# Patient Record
Sex: FEMALE | Race: WHITE | Employment: FULL TIME | URBAN - METROPOLITAN AREA
[De-identification: names, ages, dates, MRNs, and addresses within clinical notes are randomized per-mention and may not be internally consistent; named-entity substitution may affect disease eponyms.]

---

## 2022-03-18 PROBLEM — F33.9 RECURRENT DEPRESSION: Status: ACTIVE | Noted: 2018-01-03

## 2025-05-27 NOTE — THERAPY EVALUATION
discussed and agreed upon with patient.)  Short-Term Functional Goals: Time Frame: 6 weeks  Patient will demonstrate understanding of and ability to teach back appropriate water and fiber intake, toileting frequency, and positioning for improved self-management of symptoms.   Patient will demonstrate understanding of and ability to teach back two strategies to reduce constipation and improve toileting to minimize strain on and/or paradoxical movement of the pelvic floor muscles.  Patient will demonstrate ability to perform diaphragmatic breathing in multiple positions in order to improve pelvic floor muscle (PFM) lengthening and reduced discomfort and/or straining to aid in bowel evacuation.  Patient will demonstrate understanding of and ability to teach back appropriate water intake, bladder irritants, toileting frequency, and positioning for improved self-management of symptoms.     Discharge Goals: Time Frame: 12 weeks  Patient will demonstrate ability to increase intra-abdominal pressure and eccentrically contract the pelvic floor muscles without breath holding, as assessed by digitally or with use of sEMG biofeedback, in order to improve bowel evacuation.  Patient will report minimal to no pain upon examination of the levator ani muscles and report minimal or no discomfort with bowel evacuation.  Patient will be independent with implementation of a timed voiding schedule and use of urge suppression to reduce urinary frequency to 6-8x/day and 0-1x/night.            Medical Necessity:   > Skilled intervention continues to be required due to above mentioned deficits.  Reason For Services/Other Comments:  > Patient continues to require skilled intervention due to above mentioned deficits.    Regarding Emely Hammer's therapy, I certify that the treatment plan above will be carried out by a therapist or under their direction.  Thank you for this referral,  Gay Campbell, PT       Referring Physician Signature:

## 2025-05-27 NOTE — PROGRESS NOTES
Emely Hammer  : 1970  Primary: [facility] ([facility]en[facility] BAR)  Secondary:  [facility] @ [address]  [place] [address]  Phone: [phone]  Fax: [phone] Plan Frequency: 1x/week for 90 days  Plan of Care/Certification Expiration Date: 25        Plan of Care/Certification Expiration Date:  Plan of Care/Certification Expiration Date: 25    Frequency/Duration: Plan Frequency: 1x/week for 90 days      Time In/Out:   Time In: 1400  Time Out: 1500      PT Visit Info:    Progress Note Due Date: 25      Visit Count:  1    OUTPATIENT PHYSICAL THERAPY:   Treatment Note 2025       Episode  (PFPT)               Treatment Diagnosis:    Lack of coordination  Constipation, unspecified constipation type  Frequency of micturition  Feeling of incomplete bladder emptying  Pelvic and perineal pain  Medical/Referring Diagnosis:    Outlet dysfunction constipation  PFD (pelvic floor dysfunction)      Referring Physician:  Sylvia Brennan PA-C MD Orders:  PT Eval and Treat   Return MD Appt:     Date of Onset:  No data recorded   Allergies:   Patient has no allergy information on record.  Restrictions/Precautions:   None      Interventions Planned (Treatment may consist of any combination of the following):     See Assessment Note    Subjective Comments:     Initial Pain Level::     0/10  Post Session Pain Level:       0/10  Medications Last Reviewed:  2025  Updated Objective Findings:  See Evaluation Note from today  Treatment       THERAPEUTIC ACTIVITY: ( 25 minutes): Functional activity education regarding anatomy, pathology and role of pelvic floor muscle (PFM) function in relation to presenting symptoms and role of pelvic floor therapy in conservative treatment., Functional activity training on bowel massage/colonic massage. Pt provided a handout with instructions to increase performance and understanding in order to facilitate gut

## 2025-05-30 ENCOUNTER — HOSPITAL ENCOUNTER (OUTPATIENT)
Dept: PHYSICAL THERAPY | Age: 55
Setting detail: RECURRING SERIES
End: 2025-05-30
Payer: COMMERCIAL

## 2025-05-30 DIAGNOSIS — R27.9 LACK OF COORDINATION: Primary | ICD-10-CM

## 2025-05-30 DIAGNOSIS — R10.2 PELVIC AND PERINEAL PAIN: ICD-10-CM

## 2025-05-30 DIAGNOSIS — R39.14 FEELING OF INCOMPLETE BLADDER EMPTYING: ICD-10-CM

## 2025-05-30 DIAGNOSIS — K59.00 CONSTIPATION, UNSPECIFIED CONSTIPATION TYPE: ICD-10-CM

## 2025-05-30 DIAGNOSIS — R35.0 FREQUENCY OF MICTURITION: ICD-10-CM

## 2025-05-30 PROCEDURE — 97530 THERAPEUTIC ACTIVITIES: CPT

## 2025-05-30 PROCEDURE — 97161 PT EVAL LOW COMPLEX 20 MIN: CPT

## 2025-05-30 ASSESSMENT — PAIN SCALES - GENERAL: PAINLEVEL_OUTOF10: 0

## 2025-06-06 ENCOUNTER — HOSPITAL ENCOUNTER (OUTPATIENT)
Dept: PHYSICAL THERAPY | Age: 55
Setting detail: RECURRING SERIES
Discharge: HOME | End: 2025-06-09
Payer: COMMERCIAL

## 2025-06-06 PROCEDURE — 97110 THERAPEUTIC EXERCISES: CPT

## 2025-06-06 PROCEDURE — 97140 MANUAL THERAPY 1/> REGIONS: CPT

## 2025-06-06 PROCEDURE — 97530 THERAPEUTIC ACTIVITIES: CPT

## 2025-06-06 ASSESSMENT — PAIN SCALES - GENERAL: PAINLEVEL_OUTOF10: 0

## 2025-06-06 NOTE — PROGRESS NOTES
Emely Hammer  : 1970  Primary: [facility] (Keenan NGUYENBS)  Secondary:  [facility] @ [address]  [place] [address]  Phone: [phone]  Fax: [phone] Plan Frequency: 1x/week for 90 days  Plan of Care/Certification Expiration Date: 25        Plan of Care/Certification Expiration Date:  Plan of Care/Certification Expiration Date: 25    Frequency/Duration: Plan Frequency: 1x/week for 90 days      Time In/Out:   Time In: 1300  Time Out: 1353      PT Visit Info:    Progress Note Due Date: 25      Visit Count:  2    OUTPATIENT PHYSICAL THERAPY:   Treatment Note 2025       Episode  (PFPT)               Treatment Diagnosis:    No data found  Lack of coordination  Constipation, unspecified constipation type  Frequency of micturition  Feeling of incomplete bladder emptying  Pelvic and perineal pain  Contributing Diagnosis:  Bladder pain (R39.89), Constipation, unspecified (K59.00), Frequency of micturition (R35.0), Hesitancy of micturition (R39.11), Interstitial cystitis (chronic) (N30.1), Nocturia (R35.1), Pelvic floor dysfunction in female (M62.98), and Urgency of urination (R39.15)  Medical/Referring Diagnosis:    Outlet dysfunction constipation  PFD (pelvic floor dysfunction)      Referring Physician:  Sylvia Brennan PA-C MD Orders:  PT Eval and Treat   Return MD Appt:     Date of Onset:  No data recorded   Allergies:   Patient has no allergy information on record.  Restrictions/Precautions:   None      Interventions Planned (Treatment may consist of any combination of the following):     See Assessment Note    Subjective Comments:   Symptoms have gotten worse since she began menopause.      She was just diagnosed with IC last week. She did have anorectal manometry done and results showed paradoxical movement of the anal spincter with pushing.   She tends to have bowel movements every 4-7 days. She is taking mag citrate daily.

## 2025-06-13 ENCOUNTER — HOSPITAL ENCOUNTER (OUTPATIENT)
Dept: PHYSICAL THERAPY | Age: 55
Setting detail: RECURRING SERIES
Discharge: HOME | End: 2025-06-16
Payer: COMMERCIAL

## 2025-06-13 PROCEDURE — 97140 MANUAL THERAPY 1/> REGIONS: CPT

## 2025-06-13 PROCEDURE — 97110 THERAPEUTIC EXERCISES: CPT

## 2025-06-13 PROCEDURE — 97530 THERAPEUTIC ACTIVITIES: CPT

## 2025-06-13 ASSESSMENT — PAIN SCALES - GENERAL: PAINLEVEL_OUTOF10: 0

## 2025-06-13 NOTE — PROGRESS NOTES
Emely Hammer  : 1970  Primary: [facility] (Keen[facility] NGUYENBS)  Secondary:  [facility] @ [address]  [place] [address]  Phone: [phone]  Fax: [phone] Plan Frequency: 1x/week for 90 days  Plan of Care/Certification Expiration Date: 25        Plan of Care/Certification Expiration Date:  Plan of Care/Certification Expiration Date: 25    Frequency/Duration: Plan Frequency: 1x/week for 90 days      Time In/Out:   Time In: 1300  Time Out: 1355      PT Visit Info:    Progress Note Due Date: 25      Visit Count:  3    OUTPATIENT PHYSICAL THERAPY:   Treatment Note 2025       Episode  (PFPT)               Treatment Diagnosis:    No data found  Lack of coordination  Constipation, unspecified constipation type  Frequency of micturition  Feeling of incomplete bladder emptying  Pelvic and perineal pain  Contributing Diagnosis:  Bladder pain (R39.89), Constipation, unspecified (K59.00), Frequency of micturition (R35.0), Hesitancy of micturition (R39.11), Interstitial cystitis (chronic) (N30.1), Nocturia (R35.1), Pelvic floor dysfunction in female (M62.98), and Urgency of urination (R39.15)  Medical/Referring Diagnosis:    Outlet dysfunction constipation  PFD (pelvic floor dysfunction)      Referring Physician:  Sylvia Brennan PA-C MD Orders:  PT Eval and Treat   Return MD Appt:     Date of Onset:  No data recorded   Allergies:   Patient has no allergy information on record.  Restrictions/Precautions:   None      Interventions Planned (Treatment may consist of any combination of the following):     See Assessment Note    Subjective Comments:   Symptoms have gotten worse since she began menopause.      She was just diagnosed with IC last week. She did have anorectal manometry done and results showed paradoxical movement of the anal spincter with pushing.   She tends to have bowel movements every 4-7 days. She is taking mag citrate

## 2025-06-20 ENCOUNTER — APPOINTMENT (OUTPATIENT)
Dept: PHYSICAL THERAPY | Age: 55
End: 2025-06-20
Payer: COMMERCIAL

## 2025-06-27 ENCOUNTER — HOSPITAL ENCOUNTER (OUTPATIENT)
Dept: PHYSICAL THERAPY | Age: 55
Setting detail: RECURRING SERIES
Discharge: HOME | End: 2025-06-30
Payer: COMMERCIAL

## 2025-06-27 PROCEDURE — 97110 THERAPEUTIC EXERCISES: CPT

## 2025-06-27 PROCEDURE — 97530 THERAPEUTIC ACTIVITIES: CPT

## 2025-06-27 ASSESSMENT — PAIN SCALES - GENERAL: PAINLEVEL_OUTOF10: 0

## 2025-06-27 NOTE — PROGRESS NOTES
Emely Hammer  : 1970  Primary: [facility] (Keenan NGUYENBS)  Secondary:  [facility] @ [address]  [place] [address]  Phone: [phone]  Fax: [phone] Plan Frequency: 1x/week for 90 days  Plan of Care/Certification Expiration Date: 25        Plan of Care/Certification Expiration Date:  Plan of Care/Certification Expiration Date: 25    Frequency/Duration: Plan Frequency: 1x/week for 90 days      Time In/Out:   Time In: 1305  Time Out: 1355      PT Visit Info:    Progress Note Due Date: 25      Visit Count:  4    OUTPATIENT PHYSICAL THERAPY:   Treatment Note 2025       Episode  (PFPT)               Treatment Diagnosis:    No data found  Lack of coordination  Constipation, unspecified constipation type  Frequency of micturition  Feeling of incomplete bladder emptying  Pelvic and perineal pain  Contributing Diagnosis:  Bladder pain (R39.89), Constipation, unspecified (K59.00), Frequency of micturition (R35.0), Hesitancy of micturition (R39.11), Interstitial cystitis (chronic) (N30.1), Nocturia (R35.1), Pelvic floor dysfunction in female (M62.98), and Urgency of urination (R39.15)  Medical/Referring Diagnosis:    Outlet dysfunction constipation  PFD (pelvic floor dysfunction)      Referring Physician:  Sylvia Brennan PA-C MD Orders:  PT Eval and Treat   Return MD Appt:     Date of Onset:  No data recorded   Allergies:   Patient has no allergy information on record.  Restrictions/Precautions:   None      Interventions Planned (Treatment may consist of any combination of the following):     See Assessment Note    Subjective Comments:   Symptoms have gotten worse since she began menopause.      She was just diagnosed with IC last week. She did have anorectal manometry done and results showed paradoxical movement of the anal spincter with pushing.   She tends to have bowel movements every 4-7 days. She is taking mag citrate

## 2025-08-05 ENCOUNTER — CLINICAL DOCUMENTATION (OUTPATIENT)
Dept: PHYSICAL THERAPY | Age: 55
End: 2025-08-05

## 2025-08-15 ENCOUNTER — HOSPITAL ENCOUNTER (OUTPATIENT)
Dept: NON INVASIVE DIAGNOSTICS | Age: 55
Discharge: HOME | End: 2025-08-17
Payer: COMMERCIAL

## 2025-08-15 DIAGNOSIS — R01.1 HEART MURMUR: ICD-10-CM

## 2025-08-15 LAB
ECHO AO ASC DIAM: 3.1 CM
ECHO AO ROOT DIAM: 2.9 CM
ECHO AV AREA PEAK VELOCITY: 2.3 CM2
ECHO AV AREA VTI: 2.6 CM2
ECHO AV MEAN GRADIENT: 4 MMHG
ECHO AV MEAN GRADIENT: 4 MMHG
ECHO AV MEAN VELOCITY: 0.9 M/S
ECHO AV PEAK GRADIENT: 6 MMHG
ECHO AV PEAK VELOCITY: 1.3 M/S
ECHO AV VELOCITY RATIO: 0.69
ECHO AV VTI: 21.5 CM
ECHO EST RA PRESSURE: 3 MMHG
ECHO LA AREA 2C: 14.2 CM2
ECHO LA AREA 4C: 15.8 CM2
ECHO LA DIAMETER: 3.5 CM
ECHO LA MAJOR AXIS: 4.8 CM
ECHO LA MINOR AXIS: 5.2 CM
ECHO LA TO AORTIC ROOT RATIO: 1.21
ECHO LA VOL BP: 34 ML (ref 22–52)
ECHO LA VOL MOD A2C: 31 ML (ref 22–52)
ECHO LA VOL MOD A4C: 36 ML (ref 22–52)
ECHO LV E' LATERAL VELOCITY: 12.2 CM/S
ECHO LV E' SEPTAL VELOCITY: 9.68 CM/S
ECHO LV EDV A2C: 101 ML
ECHO LV EDV A4C: 81 ML
ECHO LV EF PHYSICIAN: 56 %
ECHO LV EJECTION FRACTION A2C: 57 %
ECHO LV EJECTION FRACTION A4C: 55 %
ECHO LV EJECTION FRACTION BIPLANE: 56 % (ref 55–100)
ECHO LV ESV A2C: 44 ML
ECHO LV ESV A4C: 37 ML
ECHO LV FRACTIONAL SHORTENING: 30 % (ref 28–44)
ECHO LV INTERNAL DIMENSION DIASTOLIC: 4.7 CM (ref 3.9–5.3)
ECHO LV INTERNAL DIMENSION SYSTOLIC: 3.3 CM
ECHO LV IVSD: 0.8 CM (ref 0.6–0.9)
ECHO LV MASS 2D: 142.7 G (ref 67–162)
ECHO LV POSTERIOR WALL DIASTOLIC: 1 CM (ref 0.6–0.9)
ECHO LV RELATIVE WALL THICKNESS RATIO: 0.43
ECHO LVOT AREA: 3.1 CM2
ECHO LVOT AV VTI INDEX: 0.83
ECHO LVOT DIAM: 2 CM
ECHO LVOT MEAN GRADIENT: 2 MMHG
ECHO LVOT MEAN GRADIENT: 2 MMHG
ECHO LVOT PEAK GRADIENT: 4 MMHG
ECHO LVOT PEAK VELOCITY: 0.9 M/S
ECHO LVOT SV: 55.9 ML
ECHO LVOT VTI: 17.8 CM
ECHO MV A VELOCITY: 0.84 M/S
ECHO MV AREA VTI: 2.4 CM2
ECHO MV E DECELERATION TIME (DT): 143 MS
ECHO MV E VELOCITY: 0.86 M/S
ECHO MV E/A RATIO: 1.02
ECHO MV E/E' LATERAL: 7.05
ECHO MV E/E' RATIO (AVERAGED): 7.97
ECHO MV E/E' SEPTAL: 8.88
ECHO MV LVOT VTI INDEX: 1.32
ECHO MV MAX VELOCITY: 1 M/S
ECHO MV MEAN GRADIENT: 2 MMHG
ECHO MV MEAN VELOCITY: 0.7 M/S
ECHO MV PEAK GRADIENT: 4 MMHG
ECHO MV VTI: 23.5 CM
ECHO PV ACCELERATION TIME (AT): 87 MS
ECHO PV MAX VELOCITY: 0.9 M/S
ECHO PV PEAK GRADIENT: 3 MMHG
ECHO RIGHT VENTRICULAR SYSTOLIC PRESSURE (RVSP): 21 MMHG
ECHO RV BASAL DIMENSION: 3.5 CM
ECHO RV FREE WALL PEAK S': 13.7 CM/S
ECHO RV INTERNAL DIMENSION: 2.9 CM
ECHO RV TAPSE: 2.4 CM (ref 1.7–?)
ECHO TV REGURGITANT MAX VELOCITY: 2.1 M/S

## 2025-08-15 PROCEDURE — 93306 TTE W/DOPPLER COMPLETE: CPT

## 2025-08-15 PROCEDURE — 93306 TTE W/DOPPLER COMPLETE: CPT | Performed by: INTERNAL MEDICINE
